# Patient Record
Sex: MALE | Race: ASIAN | NOT HISPANIC OR LATINO | URBAN - METROPOLITAN AREA
[De-identification: names, ages, dates, MRNs, and addresses within clinical notes are randomized per-mention and may not be internally consistent; named-entity substitution may affect disease eponyms.]

---

## 2023-08-08 ENCOUNTER — WEB ENCOUNTER (OUTPATIENT)
Dept: URBAN - METROPOLITAN AREA CLINIC 54 | Facility: CLINIC | Age: 23
End: 2023-08-08

## 2023-08-08 ENCOUNTER — DASHBOARD ENCOUNTERS (OUTPATIENT)
Age: 23
End: 2023-08-08

## 2023-08-08 ENCOUNTER — OFFICE VISIT (OUTPATIENT)
Dept: URBAN - METROPOLITAN AREA CLINIC 54 | Facility: CLINIC | Age: 23
End: 2023-08-08
Payer: COMMERCIAL

## 2023-08-08 VITALS
HEIGHT: 65 IN | TEMPERATURE: 97.2 F | SYSTOLIC BLOOD PRESSURE: 122 MMHG | HEART RATE: 92 BPM | BODY MASS INDEX: 30.82 KG/M2 | DIASTOLIC BLOOD PRESSURE: 93 MMHG | WEIGHT: 185 LBS

## 2023-08-08 DIAGNOSIS — Z20.5 EXPOSURE TO HEPATITIS B: ICD-10-CM

## 2023-08-08 DIAGNOSIS — Z80.0 FAMILY HISTORY OF COLON CANCER IN FATHER: ICD-10-CM

## 2023-08-08 PROCEDURE — 99204 OFFICE O/P NEW MOD 45 MIN: CPT | Performed by: STUDENT IN AN ORGANIZED HEALTH CARE EDUCATION/TRAINING PROGRAM

## 2023-08-08 NOTE — HPI-TODAY'S VISIT:
--8/8/23-- 22 M with family history of hepatitis b and colon cancer in his dad. Would like to be tested for hepatitis B. Denies IVDU, tatoos.

## 2023-08-10 LAB
HEP B CORE AB, IGM: (no result)
HEP BE AB: (no result)
HEP BE AG: (no result)
HEPATITIS B SURFACE AB IMMUNITY, QN: <5
HEPATITIS B SURFACE ANTIGEN: (no result)
HEPATITIS B VIRUS DNA: NOT DETECTED
HEPATITIS B VIRUS DNA: NOT DETECTED